# Patient Record
Sex: FEMALE | URBAN - METROPOLITAN AREA
[De-identification: names, ages, dates, MRNs, and addresses within clinical notes are randomized per-mention and may not be internally consistent; named-entity substitution may affect disease eponyms.]

---

## 2020-10-26 ENCOUNTER — APPOINTMENT (RX ONLY)
Dept: URBAN - METROPOLITAN AREA CLINIC 55 | Facility: CLINIC | Age: 30
Setting detail: DERMATOLOGY
End: 2020-10-26

## 2020-10-26 DIAGNOSIS — Z41.9 ENCOUNTER FOR PROCEDURE FOR PURPOSES OTHER THAN REMEDYING HEALTH STATE, UNSPECIFIED: ICD-10-CM

## 2020-10-26 PROCEDURE — ? BOTOX

## 2021-03-11 ENCOUNTER — APPOINTMENT (RX ONLY)
Dept: URBAN - METROPOLITAN AREA CLINIC 55 | Facility: CLINIC | Age: 31
Setting detail: DERMATOLOGY
End: 2021-03-11

## 2021-03-11 DIAGNOSIS — Z41.9 ENCOUNTER FOR PROCEDURE FOR PURPOSES OTHER THAN REMEDYING HEALTH STATE, UNSPECIFIED: ICD-10-CM

## 2021-03-11 PROCEDURE — ? BOTOX

## 2021-03-11 NOTE — PROCEDURE: BOTOX
Show Additional Area 2: Yes
Consent: Written consent obtained. Patient denies pregnancy and breastfeeding. Risks include but not limited to lid/brow ptosis, bruising, swelling, diplopia, temporary effect, incomplete chemical denervation.
Left Periorbital Skin Units: 0
Post-Care Instructions: Patient instructed to not lie down for 4 hours post procedure and informed not to manipulate treatment area for 4 hours. \\nRecommended follow up in 7 days if needed.
Show Right And Left Periorbital Units: No
Lot #: [id number]
Forehead Units: 6
Price (Use Numbers Only, No Special Characters Or $): 384
Additional Area 1 Location: upper lip
Detail Level: Detailed
Expiration Date (Month Year): 12/2023
Additional Area 2 Location: chin
Periorbital Skin Units: 12
Dilution (U/0.1 Cc): 4
Glabellar Complex Units: 14

## 2021-06-18 ENCOUNTER — APPOINTMENT (RX ONLY)
Dept: URBAN - METROPOLITAN AREA CLINIC 55 | Facility: CLINIC | Age: 31
Setting detail: DERMATOLOGY
End: 2021-06-18

## 2021-06-18 DIAGNOSIS — Z41.9 ENCOUNTER FOR PROCEDURE FOR PURPOSES OTHER THAN REMEDYING HEALTH STATE, UNSPECIFIED: ICD-10-CM

## 2021-06-18 PROCEDURE — ? BOTOX

## 2021-06-18 NOTE — PROCEDURE: BOTOX
[facility] Units: 0
Additional Area 2 Location: chin
Show Anterior Platysmal Band Units: Yes
Show Lcl Units: No
Post-Care Instructions: Patient instructed to not lie down for 4 hours post procedure and informed not to manipulate treatment area for 4 hours. \\nRecommended follow up in 7 days if needed.
Periorbital Skin Units: 12
Expiration Date (Month Year): 10/2023
Detail Level: Detailed
Forehead Units: 4
Price (Use Numbers Only, No Special Characters Or $): 080
Lot #: [id number]
Consent: Written consent obtained. Patient denies pregnancy and breastfeeding. Risks include but not limited to lid/brow ptosis, bruising, swelling, diplopia, temporary effect, incomplete chemical denervation.
Glabellar Complex Units: 14
Additional Area 1 Location: upper lip

## 2021-12-22 ENCOUNTER — APPOINTMENT (RX ONLY)
Dept: URBAN - METROPOLITAN AREA CLINIC 55 | Facility: CLINIC | Age: 31
Setting detail: DERMATOLOGY
End: 2021-12-22

## 2021-12-22 DIAGNOSIS — Z41.9 ENCOUNTER FOR PROCEDURE FOR PURPOSES OTHER THAN REMEDYING HEALTH STATE, UNSPECIFIED: ICD-10-CM

## 2021-12-22 PROCEDURE — ? BOTOX

## 2021-12-22 NOTE — PROCEDURE: BOTOX
Lot #: [id number]
Right Pupillary Line Units: 0
Show Right And Left Periorbital Units: No
Consent: Written consent obtained. Patient denies pregnancy and breastfeeding. Risks include but not limited to lid/brow ptosis, bruising, swelling, diplopia, temporary effect, incomplete chemical denervation.
Show Levator Superior Units: Yes
Expiration Date (Month Year): 4/24
Additional Area 2 Location: chin
Post-Care Instructions: Patient instructed to not lie down for 4 hours post procedure and informed not to manipulate treatment area for 4 hours. \\nRecommended follow up in 7 days if needed.
Detail Level: Detailed
Glabellar Complex Units: 12
Dilution (U/0.1 Cc): 4
Periorbital Skin Units: 16
Price (Use Numbers Only, No Special Characters Or $): 642
Additional Area 1 Location: upper lip

## 2022-05-20 ENCOUNTER — APPOINTMENT (RX ONLY)
Dept: URBAN - METROPOLITAN AREA CLINIC 55 | Facility: CLINIC | Age: 32
Setting detail: DERMATOLOGY
End: 2022-05-20

## 2022-05-20 DIAGNOSIS — Z41.9 ENCOUNTER FOR PROCEDURE FOR PURPOSES OTHER THAN REMEDYING HEALTH STATE, UNSPECIFIED: ICD-10-CM

## 2022-05-20 PROCEDURE — ? BOTOX

## 2022-05-20 NOTE — PROCEDURE: BOTOX
Additional Area 1 Location: upper lip
Show Anterior Platysmal Band Units: Yes
Left Pupillary Line Units: 0
Price (Use Numbers Only, No Special Characters Or $): 134
Show Ucl Units: No
Additional Area 2 Location: chin
Dilution (U/0.1 Cc): 4
Post-Care Instructions: Patient instructed to not lie down for 4 hours post procedure and informed not to manipulate treatment area for 4 hours. \\nRecommended follow up in 7 days if needed.
Periorbital Skin Units: 16
Glabellar Complex Units: 12
Expiration Date (Month Year): 4/24
Lot #: [id number]
Detail Level: Detailed
Consent: Written consent obtained. Patient denies pregnancy and breastfeeding. Risks include but not limited to lid/brow ptosis, bruising, swelling, diplopia, temporary effect, incomplete chemical denervation.

## 2023-11-17 ENCOUNTER — APPOINTMENT (RX ONLY)
Dept: URBAN - METROPOLITAN AREA CLINIC 55 | Facility: CLINIC | Age: 33
Setting detail: DERMATOLOGY
End: 2023-11-17

## 2023-11-17 DIAGNOSIS — Z41.9 ENCOUNTER FOR PROCEDURE FOR PURPOSES OTHER THAN REMEDYING HEALTH STATE, UNSPECIFIED: ICD-10-CM

## 2023-11-17 PROCEDURE — ? BOTOX

## 2023-11-17 NOTE — PROCEDURE: BOTOX
Right Pupillary Line Units: 0
Show Depressor Anguli Units: Yes
Periorbital Skin Units: 16
Show Right And Left Periorbital Units: No
Incrementing Botox Units: By 0.5 Units
Detail Level: Detailed
Dilution (U/0.1 Cc): 4
Show Inventory Tab: Show
Consent obtained and risk reviewed.
Glabellar Complex Units: 12
Comments: Make up was removed from treatment area and then prepped with 70% isopropyl alcohol prior to injections.
Additional Area 1 Location: upper lip
Post-Care Instructions: Discussed not to lie down flat  or vigorously rub the treatment area for the next 4 hours .

## 2024-01-11 NOTE — PROCEDURE: BOTOX
[FreeTextEntry1] : Preoperative clearance labs drawn EKG discussed with the patient.  Final clearance pending results Right eye periorbital cellulitis resolved
R Brow Units: 0
Post-Care Instructions: Patient instructed to not lie down for 4 hours and limit physical activity for 24 hours.
Show Levator Superior Units: Yes
Show Ucl Units: No
Glabellar Complex Units: 14
Price (Use Numbers Only, No Special Characters Or $): 444.00
Consent: Written consent obtained. Risks include but not limited to lid/brow ptosis, bruising, swelling, diplopia, temporary effect, incomplete chemical denervation.
Inferior Lateral Orbicularis Oculi Units: 12
Dilution (U/0.1 Cc): 4
Additional Area 1 Location: lip
Detail Level: Detailed
Forehead Units: 6

## 2024-08-30 ENCOUNTER — APPOINTMENT (RX ONLY)
Dept: URBAN - METROPOLITAN AREA CLINIC 55 | Facility: CLINIC | Age: 34
Setting detail: DERMATOLOGY
End: 2024-08-30

## 2024-08-30 DIAGNOSIS — Z41.9 ENCOUNTER FOR PROCEDURE FOR PURPOSES OTHER THAN REMEDYING HEALTH STATE, UNSPECIFIED: ICD-10-CM

## 2024-08-30 PROCEDURE — ? BOTOX

## 2024-08-30 NOTE — PROCEDURE: BOTOX
Additional Area 3 Units: 0
Show Glabellar Units: Yes
Show Mentalis Units: No
Periorbital Skin Units: 16
Consent obtained and risk reviewed.
Post-Care Instructions: Discussed not to lie down flat  or vigorously rub the treatment area for the next 4 hours .
Additional Area 1 Location: upper lip
Dilution (U/0.1 Cc): 4
Incrementing Botox Units: By 0.5 Units
Detail Level: Detailed
Comments: Make up was removed from treatment area and then prepped with 70% isopropyl alcohol prior to injections.
Glabellar Complex Units: 12
Forehead Units: 6
Show Inventory Tab: Show